# Patient Record
Sex: MALE | Race: BLACK OR AFRICAN AMERICAN | NOT HISPANIC OR LATINO | Employment: UNEMPLOYED | URBAN - METROPOLITAN AREA
[De-identification: names, ages, dates, MRNs, and addresses within clinical notes are randomized per-mention and may not be internally consistent; named-entity substitution may affect disease eponyms.]

---

## 2019-08-24 ENCOUNTER — HOSPITAL ENCOUNTER (EMERGENCY)
Facility: HOSPITAL | Age: 24
Discharge: HOME | End: 2019-08-25
Attending: EMERGENCY MEDICINE

## 2019-08-24 DIAGNOSIS — I95.1 ORTHOSTATIC HYPOTENSION: Primary | ICD-10-CM

## 2019-08-24 PROCEDURE — 99284 EMERGENCY DEPT VISIT MOD MDM: CPT | Mod: 25,ER

## 2019-08-24 PROCEDURE — 25000003 PHARM REV CODE 250: Mod: ER | Performed by: EMERGENCY MEDICINE

## 2019-08-24 PROCEDURE — 96360 HYDRATION IV INFUSION INIT: CPT | Mod: ER

## 2019-08-24 PROCEDURE — 63600175 PHARM REV CODE 636 W HCPCS: Mod: ER | Performed by: EMERGENCY MEDICINE

## 2019-08-24 RX ORDER — ACETAMINOPHEN 500 MG
1000 TABLET ORAL
Status: COMPLETED | OUTPATIENT
Start: 2019-08-24 | End: 2019-08-24

## 2019-08-24 RX ORDER — MECLIZINE HYDROCHLORIDE 25 MG/1
50 TABLET ORAL
Status: COMPLETED | OUTPATIENT
Start: 2019-08-24 | End: 2019-08-24

## 2019-08-24 RX ORDER — IBUPROFEN 400 MG/1
800 TABLET ORAL
Status: COMPLETED | OUTPATIENT
Start: 2019-08-24 | End: 2019-08-24

## 2019-08-24 RX ADMIN — IBUPROFEN 800 MG: 400 TABLET ORAL at 10:08

## 2019-08-24 RX ADMIN — SODIUM CHLORIDE 1000 ML: 0.9 INJECTION, SOLUTION INTRAVENOUS at 11:08

## 2019-08-24 RX ADMIN — MECLIZINE HYDROCHLORIDE 50 MG: 25 TABLET ORAL at 11:08

## 2019-08-24 RX ADMIN — ACETAMINOPHEN 1000 MG: 500 TABLET ORAL at 11:08

## 2019-08-25 VITALS
DIASTOLIC BLOOD PRESSURE: 77 MMHG | TEMPERATURE: 100 F | BODY MASS INDEX: 24.91 KG/M2 | HEIGHT: 66 IN | WEIGHT: 155 LBS | OXYGEN SATURATION: 99 % | RESPIRATION RATE: 18 BRPM | HEART RATE: 52 BPM | SYSTOLIC BLOOD PRESSURE: 133 MMHG

## 2019-08-25 LAB
BILIRUBIN, POC UA: NEGATIVE
BLOOD, POC UA: ABNORMAL
CLARITY, POC UA: CLEAR
COLOR, POC UA: YELLOW
GLUCOSE, POC UA: NEGATIVE
KETONES, POC UA: NEGATIVE
LEUKOCYTE EST, POC UA: NEGATIVE
NITRITE, POC UA: NEGATIVE
PH UR STRIP: 7 [PH]
PROTEIN, POC UA: ABNORMAL
SPECIFIC GRAVITY, POC UA: 1.02
UROBILINOGEN, POC UA: 1 E.U./DL

## 2019-08-25 RX ORDER — MECLIZINE HYDROCHLORIDE 25 MG/1
25 TABLET ORAL 3 TIMES DAILY PRN
Qty: 20 TABLET | Refills: 0 | Status: SHIPPED | OUTPATIENT
Start: 2019-08-25

## 2019-08-25 NOTE — ED PROVIDER NOTES
"Encounter Date: 8/24/2019    SCRIBE #1 NOTE: I, Dante Xie, am scribing for, and in the presence of,  Dr. Almeida. I have scribed the following portions of the note - Other sections scribed: HPI, ROS, PE.       History     Chief Complaint   Patient presents with    Dizziness     pt presents to ER with c/o dizziness, nausea, vomiting and diarrhea for past 3 days.  He states symptoms occurred after eating a hamburger.  +fever at triage and chills and sweats at home,.      Paco De Leon is a 24 y.o. male with no significant medical problems who presents to the ED complaining of acute frontal headache, dizziness, and light-headedness  for 3 days. Pt also reports emesis and a "room spinning sensation" with movement all day yesterday.  Pt took Tylenol for pain.  Denies emesis today.     Pt also reports dysphagia with some meals such as streak yesterday and hamburger today. Denies difficulty swallowing liquids.    Pt smokes 1 pack a week and marijuana everyday. Pt is allergic to penicillin. Denies drinking caffeine or EtOH. Pt works as a cook.     The history is provided by the patient. No  was used.     Review of patient's allergies indicates:   Allergen Reactions    Penicillins      History reviewed. No pertinent past medical history.  History reviewed. No pertinent surgical history.  Family History   Problem Relation Age of Onset    Diabetes Mother      Social History     Tobacco Use    Smoking status: Current Every Day Smoker     Packs/day: 0.50     Types: Cigarettes   Substance Use Topics    Alcohol use: Yes     Comment: occasionally     Drug use: Yes     Frequency: 1.0 times per week     Types: Marijuana     Review of Systems   Constitutional: Negative for fever and unexpected weight change.   HENT: Positive for trouble swallowing. Negative for drooling and voice change.    Respiratory: Negative for cough, choking and shortness of breath.    Cardiovascular: Negative for chest pain, " "palpitations and leg swelling.   Gastrointestinal: Positive for nausea and vomiting. Negative for abdominal pain, constipation and diarrhea.   Genitourinary: Negative for dysuria, frequency and hematuria.   Neurological: Positive for dizziness (with "room spinning"), light-headedness and headaches.   All other systems reviewed and are negative.      Physical Exam     Initial Vitals [08/24/19 2225]   BP Pulse Resp Temp SpO2   117/73 77 18 (!) 100.8 °F (38.2 °C) 99 %      MAP       --         Physical Exam    Nursing note and vitals reviewed.  Constitutional: He appears well-developed and well-nourished.   HENT:   Head: Normocephalic and atraumatic.   Right Ear: Tympanic membrane, external ear and ear canal normal.   Left Ear: Tympanic membrane, external ear and ear canal normal.   Mouth/Throat: Uvula is midline, oropharynx is clear and moist and mucous membranes are normal. No uvula swelling. No oropharyngeal exudate or posterior oropharyngeal edema.   Eyes: Conjunctivae and EOM are normal. Pupils are equal, round, and reactive to light.   Neck: Trachea normal, normal range of motion, full passive range of motion without pain and phonation normal. Neck supple. No stridor present.   Cardiovascular: Normal rate, regular rhythm, normal heart sounds and intact distal pulses. Exam reveals no gallop and no friction rub.    No murmur heard.  Pulmonary/Chest: Effort normal and breath sounds normal. No stridor. No respiratory distress. He has no decreased breath sounds. He has no wheezes. He has no rhonchi. He has no rales.   Abdominal: Normal appearance.   Musculoskeletal: Normal range of motion. He exhibits no edema.   Neurological: He is alert and oriented to person, place, and time. He has normal strength. No cranial nerve deficit or sensory deficit. He displays a negative Romberg sign. Coordination and gait normal. GCS eye subscore is 4. GCS verbal subscore is 5. GCS motor subscore is 6.   Skin: Skin is warm and dry. "   Psychiatric: He has a normal mood and affect. His behavior is normal.         ED Course   Procedures  Labs Reviewed   POCT URINALYSIS W/O SCOPE - Abnormal; Notable for the following components:       Result Value    Glucose, UA Negative (*)     Bilirubin, UA Negative (*)     Ketones, UA Negative (*)     Blood, UA Trace-intact (*)     Protein, UA 1+ (*)     Nitrite, UA Negative (*)     Leukocytes, UA Negative (*)     All other components within normal limits   POCT URINALYSIS W/O SCOPE          Imaging Results    None          Medical Decision Making:   History:   Old Medical Records: I decided to obtain old medical records.  Clinical Tests:   Lab Tests: Ordered and Reviewed    Labs Reviewed  Admission on 08/24/2019, Discharged on 08/25/2019   Component Date Value Ref Range Status    Glucose, UA 08/25/2019 Negative*  Final    Bilirubin, UA 08/25/2019 Negative*  Final    Ketones, UA 08/25/2019 Negative*  Final    Spec Grav UA 08/25/2019 1.025   Final    Blood, UA 08/25/2019 Trace-intact*  Final    PH, UA 08/25/2019 7.0   Final    Protein, UA 08/25/2019 1+*  Final    Urobilinogen, UA 08/25/2019 1.0  E.U./dL Final    Nitrite, UA 08/25/2019 Negative*  Final    Leukocytes, UA 08/25/2019 Negative*  Final    Color, UA 08/25/2019 Yellow   Final    Clarity, UA 08/25/2019 Clear   Final        Imaging Reviewed    Imaging Results    None         Medications given in ED    Medications   ibuprofen tablet 800 mg (800 mg Oral Given 8/24/19 2229)   sodium chloride 0.9% bolus 1,000 mL (0 mLs Intravenous Stopped 8/25/19 0052)   acetaminophen tablet 1,000 mg (1,000 mg Oral Given 8/24/19 2326)   meclizine tablet 50 mg (50 mg Oral Given 8/24/19 2327)       This document was produced by a scribe under my direction and in my presence. I agree with the content of the note and have made any necessary edits.     Stephanie Almeida MD         Note was created using voice recognition software. Note may have occasional typographical  errors that may not have been identified and edited despite good kostas initial review prior to signing.            Scribe Attestation:   Scribe #1: I performed the above scribed service and the documentation accurately describes the services I performed. I attest to the accuracy of the note.       Vitals:    08/24/19 2345 08/25/19 0049 08/25/19 0050 08/25/19 0051   BP: 127/76 120/63 127/73 133/77   BP Location: Right arm Right arm Right arm    Patient Position: Standing Lying Sitting Standing   Pulse: (!) 59 (!) 56 (!) 56 (!) 52   Resp: 18 18 18 18   Temp:       TempSrc:       SpO2:       Weight:       Height:                 ED Course as of Aug 28 0438   Sun Aug 25, 2019   0051 Patient feeling better after IVFs. Orthostatics positive prior to IVFs. Will repeat    [DL]   0054 Repeat orthostatics negative. Symptoms resolved.     [DL]      ED Course User Index  [DL] Stephanie Almeida MD     Discharge Medications     Discharge Medication List as of 8/25/2019 12:57 AM      START taking these medications    Details   meclizine (ANTIVERT) 25 mg tablet Take 1 tablet (25 mg total) by mouth 3 (three) times daily as needed for Dizziness or Nausea., Starting Sun 8/25/2019, Print                   Patient discharged to home in stable condition with instructions to:   1. Please take all meds as prescribed.  2. Follow-up with your primary care doctor   3. Return precautions discussed and patient and/or family/caretaker understands to return to the emergency room for any concerns including worsening of your current symptoms, fever, chills, night sweats, worsening pain, chest pain, shortness of breath, nausea, vomiting, diarrhea, bleeding, headache, difficulty talking, visual disturbances, weakness, numbness or any other acute concerns    Clinical Impression:     1. Orthostatic hypotension            Disposition:   Disposition: Discharged  Condition: Stable                        Stephanie Almeida MD  08/28/19 9933

## 2021-10-18 ENCOUNTER — HOSPITAL ENCOUNTER (EMERGENCY)
Facility: HOSPITAL | Age: 26
Discharge: HOME | End: 2021-10-18
Attending: EMERGENCY MEDICINE
Payer: MEDICAID

## 2021-10-18 VITALS
WEIGHT: 165 LBS | HEART RATE: 75 BPM | SYSTOLIC BLOOD PRESSURE: 133 MMHG | DIASTOLIC BLOOD PRESSURE: 57 MMHG | TEMPERATURE: 99 F | BODY MASS INDEX: 25.9 KG/M2 | OXYGEN SATURATION: 98 % | RESPIRATION RATE: 67 BRPM | HEIGHT: 67 IN

## 2021-10-18 DIAGNOSIS — G44.209 TENSION HEADACHE: Primary | ICD-10-CM

## 2021-10-18 PROCEDURE — 63600175 PHARM REV CODE 636 W HCPCS: Mod: ER | Performed by: EMERGENCY MEDICINE

## 2021-10-18 PROCEDURE — 99284 EMERGENCY DEPT VISIT MOD MDM: CPT | Mod: 25,ER

## 2021-10-18 PROCEDURE — 25000003 PHARM REV CODE 250: Mod: ER | Performed by: EMERGENCY MEDICINE

## 2021-10-18 PROCEDURE — 96372 THER/PROPH/DIAG INJ SC/IM: CPT | Mod: ER

## 2021-10-18 RX ORDER — NAPROXEN 500 MG/1
500 TABLET ORAL 2 TIMES DAILY WITH MEALS
Qty: 20 TABLET | Refills: 0 | Status: SHIPPED | OUTPATIENT
Start: 2021-10-18

## 2021-10-18 RX ORDER — KETOROLAC TROMETHAMINE 30 MG/ML
15 INJECTION, SOLUTION INTRAMUSCULAR; INTRAVENOUS
Status: COMPLETED | OUTPATIENT
Start: 2021-10-18 | End: 2021-10-18

## 2021-10-18 RX ORDER — BUTALBITAL, ACETAMINOPHEN AND CAFFEINE 50; 325; 40 MG/1; MG/1; MG/1
1 TABLET ORAL
Status: COMPLETED | OUTPATIENT
Start: 2021-10-18 | End: 2021-10-18

## 2021-10-18 RX ORDER — METOCLOPRAMIDE 10 MG/1
10 TABLET ORAL EVERY 6 HOURS PRN
Qty: 30 TABLET | Refills: 0 | Status: SHIPPED | OUTPATIENT
Start: 2021-10-18

## 2021-10-18 RX ORDER — BUTALBITAL, ACETAMINOPHEN AND CAFFEINE 50; 325; 40 MG/1; MG/1; MG/1
1 TABLET ORAL EVERY 4 HOURS PRN
Qty: 20 TABLET | Refills: 0 | Status: SHIPPED | OUTPATIENT
Start: 2021-10-18 | End: 2021-11-17

## 2021-10-18 RX ORDER — METOCLOPRAMIDE HYDROCHLORIDE 5 MG/ML
10 INJECTION INTRAMUSCULAR; INTRAVENOUS
Status: COMPLETED | OUTPATIENT
Start: 2021-10-18 | End: 2021-10-18

## 2021-10-18 RX ORDER — DIPHENHYDRAMINE HCL 25 MG
25 CAPSULE ORAL
Status: COMPLETED | OUTPATIENT
Start: 2021-10-18 | End: 2021-10-18

## 2021-10-18 RX ADMIN — KETOROLAC TROMETHAMINE 15 MG: 30 INJECTION, SOLUTION INTRAMUSCULAR at 09:10

## 2021-10-18 RX ADMIN — BUTALBITAL, ACETAMINOPHEN AND CAFFEINE 1 TABLET: 50; 325; 40 TABLET ORAL at 09:10

## 2021-10-18 RX ADMIN — METOCLOPRAMIDE 10 MG: 5 INJECTION, SOLUTION INTRAMUSCULAR; INTRAVENOUS at 09:10

## 2021-10-18 RX ADMIN — DIPHENHYDRAMINE HYDROCHLORIDE 25 MG: 25 CAPSULE ORAL at 09:10

## 2025-07-21 ENCOUNTER — HOSPITAL ENCOUNTER (EMERGENCY)
Facility: HOSPITAL | Age: 30
Discharge: HOME | End: 2025-07-22
Attending: STUDENT IN AN ORGANIZED HEALTH CARE EDUCATION/TRAINING PROGRAM

## 2025-07-21 VITALS
WEIGHT: 174 LBS | OXYGEN SATURATION: 98 % | HEART RATE: 56 BPM | BODY MASS INDEX: 27.31 KG/M2 | TEMPERATURE: 99 F | RESPIRATION RATE: 16 BRPM | SYSTOLIC BLOOD PRESSURE: 137 MMHG | HEIGHT: 67 IN | DIASTOLIC BLOOD PRESSURE: 77 MMHG

## 2025-07-21 DIAGNOSIS — S89.92XA INJURY OF LEFT KNEE, INITIAL ENCOUNTER: Primary | ICD-10-CM
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
## 2025-07-21 LAB
ABSOLUTE EOSINOPHIL (OHS): 0.06 K/UL
ABSOLUTE MONOCYTE (OHS): 1.17 K/UL (ref 0.3–1)
ABSOLUTE NEUTROPHIL COUNT (OHS): 13.21 K/UL (ref 1.8–7.7)
ANION GAP (OHS): 11 MMOL/L (ref 8–16)
BASOPHILS # BLD AUTO: 0.06 K/UL
BASOPHILS NFR BLD AUTO: 0.3 %
BUN SERPL-MCNC: 12 MG/DL (ref 6–20)
CALCIUM SERPL-MCNC: 10.2 MG/DL (ref 8.7–10.5)
CHLORIDE SERPL-SCNC: 106 MMOL/L (ref 95–110)
CO2 SERPL-SCNC: 23 MMOL/L (ref 23–29)
CREAT SERPL-MCNC: 1 MG/DL (ref 0.5–1.4)
ERYTHROCYTE [DISTWIDTH] IN BLOOD BY AUTOMATED COUNT: 13.5 % (ref 11.5–14.5)
GFR SERPLBLD CREATININE-BSD FMLA CKD-EPI: >60 ML/MIN/1.73/M2
GLUCOSE SERPL-MCNC: 94 MG/DL (ref 70–110)
HCT VFR BLD AUTO: 42.8 % (ref 40–54)
HGB BLD-MCNC: 13.8 GM/DL (ref 14–18)
IMM GRANULOCYTES # BLD AUTO: 0.09 K/UL (ref 0–0.04)
IMM GRANULOCYTES NFR BLD AUTO: 0.5 % (ref 0–0.5)
LYMPHOCYTES # BLD AUTO: 4.97 K/UL (ref 1–4.8)
MCH RBC QN AUTO: 29.4 PG (ref 27–31)
MCHC RBC AUTO-ENTMCNC: 32.2 G/DL (ref 32–36)
MCV RBC AUTO: 91 FL (ref 82–98)
NUCLEATED RBC (/100WBC) (OHS): 0 /100 WBC
PLATELET # BLD AUTO: 311 K/UL (ref 150–450)
PMV BLD AUTO: 10.2 FL (ref 9.2–12.9)
POTASSIUM SERPL-SCNC: 3.5 MMOL/L (ref 3.5–5.1)
RBC # BLD AUTO: 4.69 M/UL (ref 4.6–6.2)
RELATIVE EOSINOPHIL (OHS): 0.3 %
RELATIVE LYMPHOCYTE (OHS): 25.4 % (ref 18–48)
RELATIVE MONOCYTE (OHS): 6 % (ref 4–15)
RELATIVE NEUTROPHIL (OHS): 67.5 % (ref 38–73)
SODIUM SERPL-SCNC: 140 MMOL/L (ref 136–145)
WBC # BLD AUTO: 19.56 K/UL (ref 3.9–12.7)

## 2025-07-21 PROCEDURE — 63600175 PHARM REV CODE 636 W HCPCS: Performed by: STUDENT IN AN ORGANIZED HEALTH CARE EDUCATION/TRAINING PROGRAM

## 2025-07-21 PROCEDURE — 85025 COMPLETE CBC W/AUTO DIFF WBC: CPT | Performed by: STUDENT IN AN ORGANIZED HEALTH CARE EDUCATION/TRAINING PROGRAM

## 2025-07-21 PROCEDURE — 96374 THER/PROPH/DIAG INJ IV PUSH: CPT

## 2025-07-21 PROCEDURE — 99285 EMERGENCY DEPT VISIT HI MDM: CPT | Mod: 25

## 2025-07-21 PROCEDURE — 80048 BASIC METABOLIC PNL TOTAL CA: CPT | Performed by: STUDENT IN AN ORGANIZED HEALTH CARE EDUCATION/TRAINING PROGRAM

## 2025-07-21 PROCEDURE — 25000003 PHARM REV CODE 250: Performed by: EMERGENCY MEDICINE

## 2025-07-21 PROCEDURE — 96361 HYDRATE IV INFUSION ADD-ON: CPT

## 2025-07-21 RX ORDER — KETOROLAC TROMETHAMINE 10 MG/1
10 TABLET, FILM COATED ORAL
Status: COMPLETED | OUTPATIENT
Start: 2025-07-21 | End: 2025-07-21

## 2025-07-21 RX ORDER — MORPHINE SULFATE 4 MG/ML
4 INJECTION, SOLUTION INTRAMUSCULAR; INTRAVENOUS
Refills: 0 | Status: COMPLETED | OUTPATIENT
Start: 2025-07-21 | End: 2025-07-21

## 2025-07-21 RX ORDER — ACETAMINOPHEN 500 MG
1000 TABLET ORAL
Status: COMPLETED | OUTPATIENT
Start: 2025-07-21 | End: 2025-07-21

## 2025-07-21 RX ORDER — OXYCODONE HYDROCHLORIDE 5 MG/1
5 TABLET ORAL EVERY 6 HOURS PRN
Qty: 12 TABLET | Refills: 0 | Status: SHIPPED | OUTPATIENT
Start: 2025-07-21

## 2025-07-21 RX ADMIN — SODIUM CHLORIDE, POTASSIUM CHLORIDE, SODIUM LACTATE AND CALCIUM CHLORIDE 1000 ML: 600; 310; 30; 20 INJECTION, SOLUTION INTRAVENOUS at 07:07

## 2025-07-21 RX ADMIN — KETOROLAC TROMETHAMINE 10 MG: 10 TABLET, FILM COATED ORAL at 06:07

## 2025-07-21 RX ADMIN — ACETAMINOPHEN 1000 MG: 500 TABLET ORAL at 06:07

## 2025-07-21 RX ADMIN — MORPHINE SULFATE 4 MG: 4 INJECTION INTRAVENOUS at 06:07

## 2025-07-21 NOTE — ED NOTES
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 Denies LOC, head trauma, neck, or back pain. Endorses paresthesia to LLE. Denies pelvic pain, chest pain, SOB, or abd pain.

## 2025-07-21 NOTE — ED PROVIDER NOTES
Encounter Date: 7/21/2025       History     Chief Complaint   Patient presents with    Fall     Fell on back of truck at about 2 pm, pain  below L knee denies loc     HPI    31 y/o M no reported PMH who presents to the ED for evaluation of a fall.  Patient states that he fell off of his work truck today around 2PM.  He landed with all of his weight on to the left knee.  Patient c/o sharp pain to that area and difficulty walking.  He did not hit his head.  Denies any syncope, N/V, abd pain, CP, or dyspnea.      Review of patient's allergies indicates:   Allergen Reactions    Penicillins      History reviewed. No pertinent past medical history.  History reviewed. No pertinent surgical history.  Family History   Problem Relation Name Age of Onset    Diabetes Mother       Social History[1]  Review of Systems   Musculoskeletal:  Positive for arthralgias.       Physical Exam     Initial Vitals [07/21/25 1811]   BP Pulse Resp Temp SpO2   128/84 95 20 98.7 °F (37.1 °C) 99 %      MAP       --         Physical Exam    Nursing note and vitals reviewed.  Constitutional: He appears well-nourished.   HENT:   Head: Atraumatic.   No signs of severe head or neck trauma   Eyes: Conjunctivae and EOM are normal.   Neck: Neck supple.   Cardiovascular:  Normal rate and regular rhythm.           Pulmonary/Chest: No respiratory distress.   Abdominal: Abdomen is soft. There is no abdominal tenderness.   Musculoskeletal:      Cervical back: Neck supple.      Comments: Good perfusion b/l LE; sensation intact LE, pain with palpation of the left lateral knee and proximal lower leg, no significant deformity noted     Neurological: He is alert and oriented to person, place, and time.   Skin: Skin is warm and dry.   Psychiatric: He has a normal mood and affect. Thought content normal.         ED Course   Procedures  Labs Reviewed   CBC WITH DIFFERENTIAL - Abnormal       Result Value    WBC 19.56 (*)     RBC 4.69      HGB 13.8 (*)     HCT 42.8       MCV 91      MCH 29.4      MCHC 32.2      RDW 13.5      Platelet Count 311      MPV 10.2      Nucleated RBC 0      Neut % 67.5      Lymph % 25.4      Mono % 6.0      Eos % 0.3      Basophil % 0.3      Imm Grans % 0.5      Neut # 13.21 (*)     Lymph # 4.97 (*)     Mono # 1.17 (*)     Eos # 0.06      Baso # 0.06      Imm Grans # 0.09 (*)    BASIC METABOLIC PANEL - Normal    Sodium 140      Potassium 3.5      Chloride 106      CO2 23      Glucose 94      BUN 12      Creatinine 1.0      Calcium 10.2      Anion Gap 11      eGFR >60     CBC W/ AUTO DIFFERENTIAL    Narrative:     The following orders were created for panel order CBC auto differential.  Procedure                               Abnormality         Status                     ---------                               -----------         ------                     CBC with Differential[[id number]]       Abnormal            Final result                 Please view results for these tests on the individual orders.   LAVENDER TOP HOLD    Extra Tube Hold for add-ons.     GOLD TOP HOLD    Extra Tube Hold for add-ons.     GOLD TOP HOLD    Extra Tube Hold for add-ons.     EXTRA TUBES    Narrative:     The following orders were created for panel order EXTRA TUBES.  Procedure                               Abnormality         Status                     ---------                               -----------         ------                     Light Blue Top Hold[[id number]]                                                        Lavender Top Hold[[id number]]                               Final result               Gold Top Hold[[id number]]                                   Final result               Gold Top Hold[[id number]]                                   Final result                 Please view results for these tests on the individual orders.   LIGHT BLUE TOP HOLD                 Medications   acetaminophen tablet 1,000 mg (1,000 mg Oral Given 7/21/25 2622)   ketorolac tablet  10 mg (10 mg Oral Given 7/21/25 1824)   morphine injection 4 mg (4 mg Intravenous Given 7/21/25 1845)   lactated ringers bolus 1,000 mL (0 mLs Intravenous Stopped 7/21/25 2012)     Medical Decision Making  Amount and/or Complexity of Data Reviewed  Labs: ordered.  Radiology: ordered and independent interpretation performed. Decision-making details documented in ED Course.    Risk  Prescription drug management.                                   29 y/o M here with knee pain.  VSS in ED, exam as above.  Incidental WBC elevation, but patient without any infectious symptoms.  Denies any head trauma or pain anywhere else.  XR show no acute fracture of the knee/leg.  He remains in moderate pain with IV meds and difficulty walking. Will obtain CT knee.  Pending CT knee at shift change, oncoming MD to follow.         After DC, placed referral for PCP/FM for outpatient follow-up of incidental leukocytosis. Called patient at listed number, we discussed incidental finding and need to follow-up with PCP for recheck/followup and went over symptoms and what to watch for. Also gave instructions for walk in ortho clinic for knee, he expressed understanding, all questions answered.     Clinical Impression:  Final diagnoses:  [W19.XXXA] Fall  [S89.92XA] Injury of left knee, initial encounter (Primary)          ED Disposition Condition    Discharge Stable          ED Prescriptions       Medication Sig Dispense Start Date End Date Auth. Provider    oxyCODONE (ROXICODONE) 5 MG immediate release tablet Take 1 tablet (5 mg total) by mouth every 6 (six) hours as needed for Pain. 12 tablet 7/21/2025 -- Yolande Benson MD          Follow-up Information       Follow up With Specialties Details Why Contact Info    [facility] Sports Medicine Schedule an appointment as soon as possible for a visit   [address]  [facility] [address]  [phone]                 Gregg Valiente MD  07/22/25 7391         [1]    Social History  Tobacco Use    Smoking status: Every Day     Current packs/day: 0.50     Types: Cigarettes   Substance Use Topics    Alcohol use: Yes     Comment: occasionally     Drug use: Yes     Frequency: 1.0 times per week     Types: Marijuana        Gregg Valiente MD  07/22/25 8462

## 2025-07-21 NOTE — FIRST PROVIDER EVALUATION
Medical screening examination initiated.  I have conducted a focused provider triage encounter, findings are as follows:    Brief history of present illness:  pain in left leg post a fall off of lift gait    There were no vitals filed for this visit.    Pertinent physical exam:  uncomfortable appearing 29 yo man pain in left knee    Brief workup plan:  xr knee    Preliminary workup initiated; this workup will be continued and followed by the physician or advanced practice provider that is assigned to the patient when roomed.

## 2025-07-21 NOTE — Clinical Note
"Paco Regan" Moises was seen and treated in our emergency department on 7/21/2025.  He may return to work on 07/25/2025.       If you have any questions or concerns, please don't hesitate to call.      Jia Zhou RN    "

## 2025-07-22 DIAGNOSIS — S89.92XA INJURY OF LEFT KNEE, INITIAL ENCOUNTER: Primary | ICD-10-CM

## 2025-07-22 DIAGNOSIS — D72.829 LEUKOCYTOSIS, UNSPECIFIED TYPE: ICD-10-CM

## 2025-07-22 LAB
HOLD SPECIMEN: NORMAL

## 2025-07-22 NOTE — DISCHARGE INSTRUCTIONS
Ice the knee regularly.  Elevate it.  Tylenol and ibuprofen per package instructions over the next week.  This will help control your pain so that you can save your stronger pain medicines for severe pain.    You can put toe touch weight on your knee if you are able to do so.  Otherwise, just use crutches until you follow up with sports medicine/orthopedic surgery.

## 2025-07-22 NOTE — PROVIDER PROGRESS NOTES - EMERGENCY DEPT.
Encounter Date: 7/21/2025    ED Physician Progress Notes        I assumed care of patient at sign out pending: CT knee.    Pending CT knee. 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 No head injury. Otherwise healthy.  In a lot of pain. Negative xray. CT-ing to rule out subtle tibial plateau frx.  Neurovascularly intact        Summary of Results:    CT Knee Without Contrast Left   Final Result      No acute fracture identified in the left knee.  Outpatient MRI follow-up may provide improved sensitivity if there is concern for ligamentous or meniscal injury.         Electronically signed by: Shay Washington MD   Date:    07/21/2025   Time:    23:30      CT 3D Rendering WO Independent Workstation   Final Result      No acute fracture identified in the left knee.  Outpatient MRI follow-up may provide improved sensitivity if there is concern for ligamentous or meniscal injury.         Electronically signed by: Shay Washington MD   Date:    07/21/2025   Time:    23:30      X-Ray Tibia Fibula 2 View Left   Final Result      1. Allowing for positioning, no convincing acute displaced fracture or dislocation of the tibia or fibula.         Electronically signed by: Jayy Jung MD   Date:    07/21/2025   Time:    20:03      X-Ray Knee 1 or 2 View Left   Final Result      1. No acute displaced fracture or dislocation of the knee allowing for positioning.         Electronically signed by: Jayy Jung MD   Date:    07/21/2025   Time:    20:03      X-Ray Femur Ap/Lat Left   Final Result      1. No convincing acute displaced fracture or dislocation of the left femur.         Electronically signed by: Jayy Jung MD   Date:    07/21/2025   Time:    20:04        .    Disposition:  discharge with referral to sports med, crutches, knee immobilizer.

## 2025-07-24 ENCOUNTER — OFFICE VISIT (OUTPATIENT)
Dept: ORTHOPEDICS | Facility: CLINIC | Age: 30
End: 2025-07-24

## 2025-07-24 VITALS — BODY MASS INDEX: 27.3 KG/M2 | HEIGHT: 67 IN | WEIGHT: 173.94 LBS

## 2025-07-24 DIAGNOSIS — M25.562 ACUTE PAIN OF LEFT KNEE: Primary | ICD-10-CM

## 2025-07-24 DIAGNOSIS — S86.112A STRAIN OF GASTROCNEMIUS MUSCLE OF LEFT LOWER EXTREMITY, INITIAL ENCOUNTER: ICD-10-CM

## 2025-07-24 PROCEDURE — 99213 OFFICE O/P EST LOW 20 MIN: CPT | Mod: PBBFAC

## 2025-07-24 PROCEDURE — 99213 OFFICE O/P EST LOW 20 MIN: CPT | Mod: S$PBB,,,

## 2025-07-24 PROCEDURE — 99999 PR PBB SHADOW E&M-EST. PATIENT-LVL III: CPT | Mod: PBBFAC,,,

## 2025-07-24 NOTE — PROGRESS NOTES
SUBJECTIVE:     Chief Complaint & History of Present Illness:  History of Present Illness    CHIEF COMPLAINT:  - Left knee pain following a work-related injury.    HPI:  Mr. 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 While pulling off a pallet, his right foot got stuck underneath it. As he tried to move his foot, his left leg slipped off the pallet, causing him to fall. He felt his kneecap possibly dislocate during the fall. Pain was immediate but intensified about two hours after the incident. Initially, he had difficulty putting weight on his foot.    Pain is primarily on the outer posterior area of the knee, radiating downwards. The night before the visit, he felt pain extending to the back. Pain is most pronounced in the back of the knee and calf area. He denies hearing or feeling a pop or click during the injury. He has been using crutches but reports some improvement in his ability to walk. He has been taking oxycodone for pain management but is unsure of its effectiveness as it causes drowsiness.    He expresses concern about returning to work, noting that even driving is difficult due to the bouncing motion aggravating his knee. He reports stiffness after keeping the leg elevated for extended periods.    He denies any previous issues with his knees, surgeries, history of blood clots, or use of anticoagulants. He also denies any history of pacemakers or metal implants.    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    No past surgical history on file.    Family History   Problem Relation Name Age of Onset    Diabetes Mother         Review of patient's allergies indicates:   Allergen Reactions    Penicillins          Current Medications[1]      OBJECTIVE:     PHYSICAL EXAM:  Ht 5' 7" (1.702 m)   Wt 78.9 kg (173 lb 15.1 oz)   BMI 27.24 kg/m²   General: Pleasant, cooperative, NAD.  HEENT: NCAT, sclera nonicteric.  Lungs: Respirations are equal and unlabored.   Abdomen: Soft and non-tender.  CV: 2+ bilateral upper and lower extremity pulses.  Neuro: Sensation intact to light touch.  Skin: Intact throughout LE with no rashes, erythema, or lesions.  Extremities: No LE edema, NVI lower extremities. antalgic gait.    left Knee Exam:  Knee Range of Motion: 0-130, no pain with ROM    Effusion: none  Condition of skin: intact  Location of tenderness: Posterolateral, proximal gastroc   Strength: 5/5 quadriceps strength, 5/5 gastroc-soleus strength, 5/5 hamstring strength, and 5/5 tibialis anterior strength  Stability: stable to testing  Knee Alignment: normal  Osbaldo: negative    RADIOGRAPHS:  X-rays of the left knee taken previously were personally reviewed. Imaging reveals no acute fractures or dislocations. "Benign-appearing exostosis arising from the medial aspect of the distal femur shaft."       ASSESSMENT:       ICD-10-CM ICD-9-CM   1. Acute pain of left knee  M25.562 719.46   2. Strain of gastrocnemius muscle of left lower extremity, initial encounter  S86.112A 844.8       PLAN:     We discussed with the patient at length all the different treatment options available including anti-inflammatories, acetaminophen, rest, ice, knee strengthening exercise, occasional cortisone injections for temporary relief, Viscosupplimentation injections, arthroscopic debridement, osteotomy, and " finally knee arthroplasty.     - Findings consistent with possible gastroc strain.   - Less likely patellar dislocation/acute fracture.    Assessment & Plan    - Ordered MRI of the left knee to evaluate for soft tissue injuries, including potential meniscus damage and gastroc strain.  - Use crutches if helpful for weight-bearing.  - Apply ice to the affected area.  - Elevate the leg.  - Avoid heavy lifting or bouncing.  - Continue walking to prevent stiffness, but limit activity.  - Take ibuprofen.  - May discontinue knee immobilizer provided by ED.   - Follow up after MRI to discuss results and treatment plan.          This note was generated with the assistance of ambient listening technology. Verbal consent was obtained by the patient and accompanying visitor(s) for the recording of patient appointment to facilitate this note. I attest to having reviewed and edited the generated note for accuracy, though some syntax or spelling errors may persist. Please contact the author of this note for any clarification.         Terry Garcia PA-C         [1]   Current Outpatient Medications:     meclizine (ANTIVERT) 25 mg tablet, Take 1 tablet (25 mg total) by mouth 3 (three) times daily as needed for Dizziness or Nausea., Disp: 20 tablet, Rfl: 0    metoclopramide HCl (REGLAN) 10 MG tablet, Take 1 tablet (10 mg total) by mouth every 6 (six) hours as needed., Disp: 30 tablet, Rfl: 0    naproxen (NAPROSYN) 500 MG tablet, Take 1 tablet (500 mg total) by mouth 2 (two) times daily with meals., Disp: 20 tablet, Rfl: 0    oxyCODONE (ROXICODONE) 5 MG immediate release tablet, Take 1 tablet (5 mg total) by mouth every 6 (six) hours as needed for Pain., Disp: 12 tablet, Rfl: 0